# Patient Record
Sex: MALE | Race: OTHER | HISPANIC OR LATINO
[De-identification: names, ages, dates, MRNs, and addresses within clinical notes are randomized per-mention and may not be internally consistent; named-entity substitution may affect disease eponyms.]

---

## 2024-03-05 ENCOUNTER — APPOINTMENT (OUTPATIENT)
Dept: ORTHOPEDIC SURGERY | Facility: CLINIC | Age: 54
End: 2024-03-05
Payer: MEDICARE

## 2024-03-05 VITALS — WEIGHT: 255 LBS | BODY MASS INDEX: 34.54 KG/M2 | HEIGHT: 72 IN

## 2024-03-05 DIAGNOSIS — M75.22 BICIPITAL TENDINITIS, LEFT SHOULDER: ICD-10-CM

## 2024-03-05 PROBLEM — Z00.00 ENCOUNTER FOR PREVENTIVE HEALTH EXAMINATION: Status: ACTIVE | Noted: 2024-03-05

## 2024-03-05 PROCEDURE — 99203 OFFICE O/P NEW LOW 30 MIN: CPT

## 2024-03-05 PROCEDURE — 73080 X-RAY EXAM OF ELBOW: CPT | Mod: LT

## 2024-03-05 RX ORDER — MELOXICAM 15 MG/1
15 TABLET ORAL
Qty: 30 | Refills: 2 | Status: ACTIVE | COMMUNITY
Start: 2024-03-05 | End: 1900-01-01

## 2024-03-05 NOTE — IMAGING
[de-identified] : Physical examination left elbow: No swelling, ecchymosis, erythema appreciated.  Skin is intact.  Tense palpation of the distal biceps tendon.  No tenderness to the medial or lateral epicondyle.  No tenderness of the extensor tendons.  Good strength throughout.  Sensorimotor intact distally.  Neuro vas intact.  Negative hook test.  Negative speeds.  Sensorimotor intact distally.  Neuro vas intact.  X-rays of left elbow taken in the office today:  No acute fractures, subluxations, or dislocations.

## 2024-03-05 NOTE — DISCUSSION/SUMMARY
[de-identified] : My clinical suspicion is high for biceps tendinitis given the patient's history, physical examination findings and x-ray findings.  Recommended anti-inflammatory medication.  Meloxicam sent to patient pharmacy to be taken as needed for pain.  Confirmed no contraindication to NSAIDs.  Risks and benefits discussed.  Red flag symptoms discussed.  Script for physical therapy provided for improved ROM and strengthening of surrounding musculature. Benefits discussed.  I recommended patient rest, ice, compress, and elevate the regularly. Encouraged activity modification as tolerable. Encouraged gentle range of motion to avoid stiffness.  All questions and concerns addressed to patient's satisfaction. Patient expresses full understanding of treatment plan. I have the patient follow-up in 4 to 6 weeks for repeat evaluation and treatment.

## 2024-03-05 NOTE — HISTORY OF PRESENT ILLNESS
[de-identified] : 54-year-old male here for evaluation of left biceps pain.  Patient was pain at the distal biceps tendon that which started about 3 days ago.  Denies any specific incident of trauma or falls.  Denies any pop or click at his distal biceps.  He reports the pain is gradual and aching in nature.  Reports he does lots of heavy lifting and repeated motions.  Denies any numbness or tingling.

## 2024-04-02 ENCOUNTER — APPOINTMENT (OUTPATIENT)
Dept: ORTHOPEDIC SURGERY | Facility: CLINIC | Age: 54
End: 2024-04-02